# Patient Record
Sex: MALE | Race: WHITE | NOT HISPANIC OR LATINO | URBAN - METROPOLITAN AREA
[De-identification: names, ages, dates, MRNs, and addresses within clinical notes are randomized per-mention and may not be internally consistent; named-entity substitution may affect disease eponyms.]

---

## 2024-07-30 ENCOUNTER — OFFICE VISIT (OUTPATIENT)
Dept: URBAN - METROPOLITAN AREA CLINIC 80 | Facility: CLINIC | Age: 19
End: 2024-07-30
Payer: COMMERCIAL

## 2024-07-30 ENCOUNTER — DASHBOARD ENCOUNTERS (OUTPATIENT)
Age: 19
End: 2024-07-30

## 2024-07-30 VITALS
HEIGHT: 69 IN | TEMPERATURE: 98.4 F | DIASTOLIC BLOOD PRESSURE: 70 MMHG | SYSTOLIC BLOOD PRESSURE: 120 MMHG | WEIGHT: 173 LBS | HEART RATE: 85 BPM | BODY MASS INDEX: 25.62 KG/M2

## 2024-07-30 DIAGNOSIS — K58.1 IRRITABLE BOWEL SYNDROME WITH CONSTIPATION: ICD-10-CM

## 2024-07-30 PROBLEM — 440630006: Status: ACTIVE | Noted: 2024-07-30

## 2024-07-30 PROCEDURE — 99204 OFFICE O/P NEW MOD 45 MIN: CPT | Performed by: PHYSICIAN ASSISTANT

## 2024-07-30 NOTE — HPI-TODAY'S VISIT:
Pt states most of his life he has had constipation issues - worse it has ever been starting a few months ago - 1 BM q3-4 days in past had to take Miralax - if he took it daily he would have 1 BM q 3d wakes up bloated and nauseated most days no abd pain no emesis no testing done in the past no help with fiber no family hx IBD, colon ca or polyps - just family hx IBS drinks a lot of water, exercises bm will relieve bloating and nausea until he eats again no heartburn or indigestion